# Patient Record
Sex: FEMALE | Race: WHITE | HISPANIC OR LATINO | Employment: FULL TIME | URBAN - NONMETROPOLITAN AREA
[De-identification: names, ages, dates, MRNs, and addresses within clinical notes are randomized per-mention and may not be internally consistent; named-entity substitution may affect disease eponyms.]

---

## 2018-07-23 PROBLEM — F41.9 ANXIETY: Status: ACTIVE | Noted: 2018-07-23

## 2020-03-24 PROBLEM — Z01.419 WELL WOMAN EXAM WITH ROUTINE GYNECOLOGICAL EXAM: Status: ACTIVE | Noted: 2020-03-24

## 2020-03-24 PROBLEM — R73.03 PREDIABETES: Status: ACTIVE | Noted: 2020-03-24

## 2022-03-01 PROBLEM — I10 HYPERTENSION: Status: ACTIVE | Noted: 2022-03-01

## 2023-07-08 ENCOUNTER — OFFICE VISIT (OUTPATIENT)
Dept: URGENT CARE | Facility: CLINIC | Age: 43
End: 2023-07-08
Payer: COMMERCIAL

## 2023-07-08 VITALS
SYSTOLIC BLOOD PRESSURE: 124 MMHG | HEIGHT: 64 IN | DIASTOLIC BLOOD PRESSURE: 82 MMHG | WEIGHT: 203 LBS | RESPIRATION RATE: 18 BRPM | TEMPERATURE: 98.6 F | BODY MASS INDEX: 34.66 KG/M2 | HEART RATE: 67 BPM | OXYGEN SATURATION: 99 %

## 2023-07-08 DIAGNOSIS — R07.89 CHEST PRESSURE: ICD-10-CM

## 2023-07-08 PROCEDURE — 99204 OFFICE O/P NEW MOD 45 MIN: CPT | Performed by: PHYSICIAN ASSISTANT

## 2023-07-08 PROCEDURE — 93000 ELECTROCARDIOGRAM COMPLETE: CPT | Performed by: PHYSICIAN ASSISTANT

## 2023-07-08 PROCEDURE — 3079F DIAST BP 80-89 MM HG: CPT | Performed by: PHYSICIAN ASSISTANT

## 2023-07-08 PROCEDURE — 3074F SYST BP LT 130 MM HG: CPT | Performed by: PHYSICIAN ASSISTANT

## 2023-07-08 ASSESSMENT — ENCOUNTER SYMPTOMS
MYALGIAS: 0
FEVER: 0
ABDOMINAL PAIN: 0
SORE THROAT: 0
VOMITING: 0
COUGH: 0
NAUSEA: 0
DIARRHEA: 0
CHILLS: 0
SHORTNESS OF BREATH: 0
CONSTIPATION: 1
EYE PAIN: 0
HEADACHES: 0

## 2023-07-08 ASSESSMENT — FIBROSIS 4 INDEX: FIB4 SCORE: 0.42

## 2023-07-08 NOTE — PROGRESS NOTES
"Subjective:   Verena Oliveira is a 42 y.o. female who presents for GI Problem (Chest pressure , x 2 days bloating having issues going to restroom )      Patient noted subsequent to a large lunch (salmon/pasta) yesterday a persistent chest pressure/fullness.  She's noted an ongoing issue with constipation for several months.  History prediabetes and hypertension, no personal history of cardiac disease or family history of early cardiac death.  Patient notes decreased appetite, been slightly more uncomfortable when trying to eat meat.  Has not noted any exertional worsening or shortness of breath.  Pain does not radiate.  She has no n/v    Review of Systems   Constitutional:  Negative for chills and fever.   HENT:  Negative for congestion, ear pain and sore throat.    Eyes:  Negative for pain.   Respiratory:  Negative for cough and shortness of breath.    Cardiovascular:  Positive for chest pain.   Gastrointestinal:  Positive for constipation. Negative for abdominal pain, diarrhea, nausea and vomiting.   Genitourinary:  Negative for dysuria.   Musculoskeletal:  Negative for myalgias.   Skin:  Negative for rash.   Neurological:  Negative for headaches.       Medications, Allergies, and current problem list reviewed today in Epic.     Objective:     /82 (BP Location: Right arm, Patient Position: Sitting, BP Cuff Size: Adult)   Pulse 67   Temp 37 °C (98.6 °F) (Temporal)   Resp 18   Ht 1.626 m (5' 4\")   Wt 92.1 kg (203 lb)   SpO2 99%     Physical Exam  Vitals reviewed.   Constitutional:       Appearance: Normal appearance.   HENT:      Head: Normocephalic and atraumatic.      Right Ear: Tympanic membrane, ear canal and external ear normal.      Left Ear: Tympanic membrane, ear canal and external ear normal.      Nose: Nose normal. No rhinorrhea.      Mouth/Throat:      Mouth: Mucous membranes are moist.      Pharynx: Oropharynx is clear.   Eyes:      Conjunctiva/sclera: Conjunctivae normal.      Pupils: Pupils " "are equal, round, and reactive to light.   Cardiovascular:      Rate and Rhythm: Normal rate and regular rhythm.      Pulses: Normal pulses.      Heart sounds: No murmur heard.  Pulmonary:      Effort: Pulmonary effort is normal.      Breath sounds: Normal breath sounds.   Chest:      Chest wall: Tenderness (Pain reproducible with gentle pressure over the sternum) present.   Abdominal:      Tenderness: There is no abdominal tenderness. There is no guarding or rebound.   Musculoskeletal:      Cervical back: Normal range of motion.   Lymphadenopathy:      Cervical: No cervical adenopathy.   Skin:     General: Skin is warm and dry.      Capillary Refill: Capillary refill takes less than 2 seconds.   Neurological:      Mental Status: She is alert and oriented to person, place, and time.         Assessment/Plan:     Diagnosis and associated orders:     1. Chest pressure  EKG - Clinic Performed         Comments/MDM:     EKG interpreted by me at time 20 sinus with a rate of 67 normal intervals upright axis.  Subtle changes in lead II/III and aVF with T wave inversion inferiorly and nonspecific ST-T wave changes in inferior leads  Patient's symptoms and timing are slightly more consistent with gastrointestinal issues such as GERD or actually exacerbation of the hiatal hernia.  She does have a slightly abnormal EKG and there is no previous for comparison.  Her symptoms have improved with rest over the last 24 hours.  She was wanting to \"make sure everything is okay\" and I discussed with her the limitations of an EKG.  We cannot guarantee that there is not a cardiac or pulmonary condition ongoing and I encouraged her to present to the ER if her pain feels quite inconsistent with her previous gastrointestinal pains.  She understands the limitations of the EKG as well as my instructions and will plan on monitoring symptoms at home and considering ER evaluation.  Also of note she recently started phentermine in the 2 days " preceding onset of symptoms has not taken it yesterday or today so this could be more of a medication side effect of potential exacerbation of her anxiety.         Differential diagnosis, natural history, supportive care, and indications for immediate follow-up discussed.    Advised the patient to follow-up with the primary care physician for recheck, reevaluation, and consideration of further management.    Please note that this dictation was created using voice recognition software. I have made a reasonable attempt to correct obvious errors, but I expect that there are errors of grammar and possibly content that I did not discover before finalizing the note.    This note was electronically signed by Herrera Treadwell PA-C

## 2023-07-19 PROBLEM — R07.89 OTHER CHEST PAIN: Status: ACTIVE | Noted: 2023-07-19
